# Patient Record
(demographics unavailable — no encounter records)

---

## 2025-01-28 NOTE — ASSESSMENT
[FreeTextEntry1] : The patient was advised of the diagnosis. The natural history of the pathology was explained in full to the patient in layman's terms. All questions were answered. The risks and benefits of surgical and non-surgical treatment alternatives were explained in full to the patient.  A hand based thumb spica brace was applied.  The importance of ice and elevation were discussed with the patient.  The risks were also discussed such as compartment syndrome and skin breakdown.  They were instructed to never put foreign objects down the brace.  Patients should call for increasing pain, worsening swelling, numbness, extremity discoloration, or any other concerns.  Gym and sports okay in brace.   RTO 4 weeks

## 2025-01-28 NOTE — HISTORY OF PRESENT ILLNESS
[Dull/Aching] : dull/aching [Localized] : localized [Sharp] : sharp [Intermittent] : intermittent [Nothing helps with pain getting better] : Nothing helps with pain getting better [Exercising] : exercising [Student] : Work status: student [de-identified] :  01/28/2025 :CHRIS FIGUEROA , a 10 year old male, presents today for NP right thumb pain injured on 1/27/25 while playing at school after hitting his hand on the ground. Was seen at [facility] MD had xrays and was placed in a brace (wrong sided brace)

## 2025-01-28 NOTE — IMAGING
[de-identified] : Right thumb: NVID Limited ROM TTP over proximal phalanx of thumb Skin intact  right thumb x-rays 3 views taken in office today demonstrate fracture of base of proximal phalanx of thumb

## 2025-02-27 NOTE — IMAGING
[de-identified] : Pt with no ttp over the right thumb p1 no ligamentous laxity strength is 5/5 in all planes  right thumb 2/27/2025 showed P1 fracture healed in acceptable alignment.

## 2025-02-27 NOTE — HISTORY OF PRESENT ILLNESS
[Dull/Aching] : dull/aching [Localized] : localized [Sharp] : sharp [Intermittent] : intermittent [Nothing helps with pain getting better] : Nothing helps with pain getting better [Exercising] : exercising [Student] : Work status: student [0] : 0 [de-identified] :  02/27/25- follow up for right thumb P1 fracture f/u. Pt state pain has resolved.    01/28/2025 :CHRIS FIGUEROA , a 10 year old male, presents today for NP right thumb pain injured on 1/27/25 while playing at school after hitting his hand on the ground. Was seen at [facility] MD had xrays and was placed in a brace (wrong sided brace)

## 2025-02-27 NOTE — ASSESSMENT
[FreeTextEntry1] : The patient was advised of the diagnosis. The natural history of the pathology was explained in full to the patient in layman's terms. All questions were answered. The risks and benefits of surgical and non-surgical treatment alternatives were explained in full to the patient.   -fx healed -pt will rto prn.